# Patient Record
Sex: FEMALE | Race: ASIAN | NOT HISPANIC OR LATINO | URBAN - METROPOLITAN AREA
[De-identification: names, ages, dates, MRNs, and addresses within clinical notes are randomized per-mention and may not be internally consistent; named-entity substitution may affect disease eponyms.]

---

## 2017-11-29 PROBLEM — Z00.00 ENCOUNTER FOR PREVENTIVE HEALTH EXAMINATION: Status: ACTIVE | Noted: 2017-11-29

## 2017-12-12 VITALS
HEIGHT: 63 IN | OXYGEN SATURATION: 97 % | HEART RATE: 71 BPM | DIASTOLIC BLOOD PRESSURE: 68 MMHG | TEMPERATURE: 97 F | WEIGHT: 116.18 LBS | SYSTOLIC BLOOD PRESSURE: 151 MMHG

## 2017-12-12 RX ORDER — CHLORHEXIDINE GLUCONATE 213 G/1000ML
1 SOLUTION TOPICAL ONCE
Qty: 0 | Refills: 0 | Status: DISCONTINUED | OUTPATIENT
Start: 2017-12-14 | End: 2017-12-14

## 2017-12-12 NOTE — H&P ADULT - PMH
Breast cancer    CAD (coronary artery disease)    DM (diabetes mellitus)    GERD (gastroesophageal reflux disease)    HTN (hypertension)    Osteoarthritis    Prolapsed lumbar disc

## 2017-12-12 NOTE — H&P ADULT - ASSESSMENT
82 y/o F with PMHx HTN, DM, GERD, breast CA (s/p lumpectomy, received chemo and radiation tx until summer of 2015), CAD with prior cardiac cath in 2002 showing non-obstructive CAD          ASA III Mallampati ??  Pre-cath consented  Loaded     Risks & benefits of procedure and alternative therapy have been explained to the patient including but not limited to: allergic reaction, bleeding w/possible need for blood transfusion, infection, renal and vascular compromise, limb damage, arrhythmia, stroke, vessel dissection/perforation, Myocardial infarction, emergent CABG. Informed consent obtained and in chart. 82 y/o F with PMHx HTN, DM, GERD, breast CA (s/p lumpectomy, received chemo and radiation tx until summer of 2015), CAD with prior cardiac cath in 2002 showing non-obstructive CAD presents for cardiac catheterization secondary to CCS IV anginal equivalent symptoms with possible intervention          ASA III Mallampati III  Pre-cath consented      Risks & benefits of procedure and alternative therapy have been explained to the patient including but not limited to: allergic reaction, bleeding w/possible need for blood transfusion, infection, renal and vascular compromise, limb damage, arrhythmia, stroke, vessel dissection/perforation, Myocardial infarction, emergent CABG. Informed consent obtained and in chart. 84 y/o F with PMHx HTN, DM, GERD, breast CA (s/p lumpectomy, received chemo and radiation tx until summer of 2015), CAD with prior cardiac cath in 2002 showing non-obstructive CAD presents for cardiac catheterization secondary to CCS IV anginal equivalent symptoms with possible intervention          ASA III Mallampati III  Pre-cath consented  As discussed with Dr. Mendoza pt. Hgb 10.8 and Hct: 32.1 pt was give ASA 81mg PO x1      Risks & benefits of procedure and alternative therapy have been explained to the patient including but not limited to: allergic reaction, bleeding w/possible need for blood transfusion, infection, renal and vascular compromise, limb damage, arrhythmia, stroke, vessel dissection/perforation, Myocardial infarction, emergent CABG. Informed consent obtained and in chart.

## 2017-12-12 NOTE — H&P ADULT - HISTORY OF PRESENT ILLNESS
*SKELETON  84 y/o F with PMHx HTN, DM, GERD, breast CA (s/p lumpectomy, received chemo and radiation tx until summer of 2016), CAD with prior cardiac cath in 2002 showing non-obstructive CAD, who presented to her cardiologist c/o an episode of chest pain/heaving occurring at rest and lasting for 3-4 minutes.    EKG from the office shows sinus rhythm with known LBBB. In light of patient's risk factors, CCS Class IV angina symptoms, patient is recommended for cardiac catheterization with possible intervention if clinically indicated. *Patient bringing in medications, please confirm  82 y/o F with PMHx HTN, DM, GERD, breast CA (s/p lumpectomy, received chemo and radiation tx until summer of 2015), CAD with prior cardiac cath in 2002 showing non-obstructive CAD, who presented to her cardiologist c/o an episode of chest pain/heaviness occurring at rest and lasting for 3-4 minutes which spontaneously resolved. She denies SOB, dizziness, palpitations, n/v, diaphoresis, LE edema, orthopnea, or syncope. She also reports a prior CTA performed years ago showing calcifications at that time. EKG from the office shows sinus rhythm with known LBBB. In light of patient's risk factors, CCS Class IV angina symptoms, and known CAD, patient is recommended for cardiac catheterization with possible intervention if clinically indicated for further evaluation. 84 y/o F with PMHx HTN, DM, GERD, breast CA (s/p lumpectomy, received chemo and radiation tx until summer of 2015), CAD with prior cardiac cath in 2002 showing non-obstructive CAD, who presented to her cardiologist c/o an episode of chest pain/heaviness occurring at rest and lasting for 3-4 minutes which spontaneously resolved. She denies SOB, dizziness, palpitations, n/v, diaphoresis, LE edema, orthopnea, or syncope. She also reports a prior CTA performed years ago showing calcifications at that time. EKG from the office shows sinus rhythm with known LBBB. In light of patient's risk factors, CCS Class IV angina symptoms, and known CAD, patient is recommended for cardiac catheterization with possible intervention if clinically indicated for further evaluation.

## 2017-12-12 NOTE — H&P ADULT - FAMILY HISTORY
Sibling  Still living? Unknown  Family history of coronary artery disease, Age at diagnosis: Age Unknown

## 2017-12-14 ENCOUNTER — OUTPATIENT (OUTPATIENT)
Dept: OUTPATIENT SERVICES | Facility: HOSPITAL | Age: 82
LOS: 1 days | Discharge: NURSING, REHAB OR LONG-TERM CARE | End: 2017-12-14
Payer: MEDICARE

## 2017-12-14 DIAGNOSIS — Z98.890 OTHER SPECIFIED POSTPROCEDURAL STATES: Chronic | ICD-10-CM

## 2017-12-14 DIAGNOSIS — Z90.49 ACQUIRED ABSENCE OF OTHER SPECIFIED PARTS OF DIGESTIVE TRACT: Chronic | ICD-10-CM

## 2017-12-14 DIAGNOSIS — Z96.659 PRESENCE OF UNSPECIFIED ARTIFICIAL KNEE JOINT: Chronic | ICD-10-CM

## 2017-12-14 LAB
ALBUMIN SERPL ELPH-MCNC: 3.9 G/DL — SIGNIFICANT CHANGE UP (ref 3.3–5)
ALP SERPL-CCNC: 44 U/L — SIGNIFICANT CHANGE UP (ref 40–120)
ALT FLD-CCNC: 13 U/L — SIGNIFICANT CHANGE UP (ref 10–45)
ANION GAP SERPL CALC-SCNC: 12 MMOL/L — SIGNIFICANT CHANGE UP (ref 5–17)
APTT BLD: 36.1 SEC — SIGNIFICANT CHANGE UP (ref 27.5–37.4)
AST SERPL-CCNC: 17 U/L — SIGNIFICANT CHANGE UP (ref 10–40)
BASOPHILS NFR BLD AUTO: 0.3 % — SIGNIFICANT CHANGE UP (ref 0–2)
BILIRUB SERPL-MCNC: 0.5 MG/DL — SIGNIFICANT CHANGE UP (ref 0.2–1.2)
BUN SERPL-MCNC: 13 MG/DL — SIGNIFICANT CHANGE UP (ref 7–23)
CALCIUM SERPL-MCNC: 10.1 MG/DL — SIGNIFICANT CHANGE UP (ref 8.4–10.5)
CHLORIDE SERPL-SCNC: 96 MMOL/L — SIGNIFICANT CHANGE UP (ref 96–108)
CHOLEST SERPL-MCNC: 141 MG/DL — SIGNIFICANT CHANGE UP (ref 10–199)
CK MB CFR SERPL CALC: 1.9 NG/ML — SIGNIFICANT CHANGE UP (ref 0–6.7)
CK SERPL-CCNC: 109 U/L — SIGNIFICANT CHANGE UP (ref 25–170)
CO2 SERPL-SCNC: 28 MMOL/L — SIGNIFICANT CHANGE UP (ref 22–31)
CREAT SERPL-MCNC: 0.8 MG/DL — SIGNIFICANT CHANGE UP (ref 0.5–1.3)
CRP SERPL-MCNC: 0.1 MG/DL — SIGNIFICANT CHANGE UP (ref 0–0.4)
EOSINOPHIL NFR BLD AUTO: 6.7 % — HIGH (ref 0–6)
GLUCOSE BLDC GLUCOMTR-MCNC: 157 MG/DL — HIGH (ref 70–99)
GLUCOSE SERPL-MCNC: 124 MG/DL — HIGH (ref 70–99)
HBA1C BLD-MCNC: 6.8 % — HIGH (ref 4–5.6)
HCT VFR BLD CALC: 32.1 % — LOW (ref 34.5–45)
HDLC SERPL-MCNC: 37 MG/DL — LOW (ref 40–125)
HGB BLD-MCNC: 10.8 G/DL — LOW (ref 11.5–15.5)
INR BLD: 1.01 — SIGNIFICANT CHANGE UP (ref 0.88–1.16)
LIPID PNL WITH DIRECT LDL SERPL: 85 MG/DL — SIGNIFICANT CHANGE UP
LYMPHOCYTES # BLD AUTO: 34.4 % — SIGNIFICANT CHANGE UP (ref 13–44)
MCHC RBC-ENTMCNC: 29.4 PG — SIGNIFICANT CHANGE UP (ref 27–34)
MCHC RBC-ENTMCNC: 33.6 G/DL — SIGNIFICANT CHANGE UP (ref 32–36)
MCV RBC AUTO: 87.5 FL — SIGNIFICANT CHANGE UP (ref 80–100)
MONOCYTES NFR BLD AUTO: 6.5 % — SIGNIFICANT CHANGE UP (ref 2–14)
NEUTROPHILS NFR BLD AUTO: 52.1 % — SIGNIFICANT CHANGE UP (ref 43–77)
PLATELET # BLD AUTO: 286 K/UL — SIGNIFICANT CHANGE UP (ref 150–400)
POTASSIUM SERPL-MCNC: 4 MMOL/L — SIGNIFICANT CHANGE UP (ref 3.5–5.3)
POTASSIUM SERPL-SCNC: 4 MMOL/L — SIGNIFICANT CHANGE UP (ref 3.5–5.3)
PROT SERPL-MCNC: 7.1 G/DL — SIGNIFICANT CHANGE UP (ref 6–8.3)
PROTHROM AB SERPL-ACNC: 11.2 SEC — SIGNIFICANT CHANGE UP (ref 9.8–12.7)
RBC # BLD: 3.67 M/UL — LOW (ref 3.8–5.2)
RBC # FLD: 14.4 % — SIGNIFICANT CHANGE UP (ref 10.3–16.9)
SODIUM SERPL-SCNC: 136 MMOL/L — SIGNIFICANT CHANGE UP (ref 135–145)
TOTAL CHOLESTEROL/HDL RATIO MEASUREMENT: 3.8 RATIO — SIGNIFICANT CHANGE UP (ref 3.3–7.1)
TRIGL SERPL-MCNC: 96 MG/DL — SIGNIFICANT CHANGE UP (ref 10–149)
WBC # BLD: 5.8 K/UL — SIGNIFICANT CHANGE UP (ref 3.8–10.5)
WBC # FLD AUTO: 5.8 K/UL — SIGNIFICANT CHANGE UP (ref 3.8–10.5)

## 2017-12-14 PROCEDURE — 82553 CREATINE MB FRACTION: CPT

## 2017-12-14 PROCEDURE — 93010 ELECTROCARDIOGRAM REPORT: CPT

## 2017-12-14 PROCEDURE — 85025 COMPLETE CBC W/AUTO DIFF WBC: CPT

## 2017-12-14 PROCEDURE — 83036 HEMOGLOBIN GLYCOSYLATED A1C: CPT

## 2017-12-14 PROCEDURE — 36415 COLL VENOUS BLD VENIPUNCTURE: CPT

## 2017-12-14 PROCEDURE — 82550 ASSAY OF CK (CPK): CPT

## 2017-12-14 PROCEDURE — 85730 THROMBOPLASTIN TIME PARTIAL: CPT

## 2017-12-14 PROCEDURE — 86140 C-REACTIVE PROTEIN: CPT

## 2017-12-14 PROCEDURE — C1894: CPT

## 2017-12-14 PROCEDURE — 82962 GLUCOSE BLOOD TEST: CPT

## 2017-12-14 PROCEDURE — 93005 ELECTROCARDIOGRAM TRACING: CPT

## 2017-12-14 PROCEDURE — 93458 L HRT ARTERY/VENTRICLE ANGIO: CPT

## 2017-12-14 PROCEDURE — 85610 PROTHROMBIN TIME: CPT

## 2017-12-14 PROCEDURE — C1889: CPT

## 2017-12-14 PROCEDURE — 80061 LIPID PANEL: CPT

## 2017-12-14 PROCEDURE — C1769: CPT

## 2017-12-14 PROCEDURE — 80053 COMPREHEN METABOLIC PANEL: CPT

## 2017-12-14 PROCEDURE — C1887: CPT

## 2017-12-14 RX ORDER — METFORMIN HYDROCHLORIDE 850 MG/1
1 TABLET ORAL
Qty: 0 | Refills: 0 | COMMUNITY

## 2017-12-14 RX ORDER — OMEPRAZOLE 10 MG/1
1 CAPSULE, DELAYED RELEASE ORAL
Qty: 0 | Refills: 0 | COMMUNITY

## 2017-12-14 RX ORDER — AMLODIPINE BESYLATE 2.5 MG/1
1 TABLET ORAL
Qty: 0 | Refills: 0 | COMMUNITY

## 2017-12-14 RX ORDER — EXEMESTANE 25 MG/1
1 TABLET, SUGAR COATED ORAL
Qty: 0 | Refills: 0 | COMMUNITY

## 2017-12-14 RX ORDER — ASPIRIN/CALCIUM CARB/MAGNESIUM 324 MG
1 TABLET ORAL
Qty: 0 | Refills: 0 | COMMUNITY

## 2017-12-14 RX ORDER — SODIUM CHLORIDE 9 MG/ML
500 INJECTION INTRAMUSCULAR; INTRAVENOUS; SUBCUTANEOUS
Qty: 0 | Refills: 0 | Status: DISCONTINUED | OUTPATIENT
Start: 2017-12-14 | End: 2017-12-14

## 2017-12-14 RX ORDER — INSULIN LISPRO 100/ML
VIAL (ML) SUBCUTANEOUS ONCE
Qty: 0 | Refills: 0 | Status: DISCONTINUED | OUTPATIENT
Start: 2017-12-14 | End: 2017-12-14

## 2017-12-14 RX ORDER — ASPIRIN/CALCIUM CARB/MAGNESIUM 324 MG
81 TABLET ORAL ONCE
Qty: 0 | Refills: 0 | Status: COMPLETED | OUTPATIENT
Start: 2017-12-14 | End: 2017-12-14

## 2017-12-14 RX ORDER — CITALOPRAM 10 MG/1
1.5 TABLET, FILM COATED ORAL
Qty: 0 | Refills: 0 | COMMUNITY

## 2017-12-14 RX ORDER — CARVEDILOL PHOSPHATE 80 MG/1
1 CAPSULE, EXTENDED RELEASE ORAL
Qty: 0 | Refills: 0 | COMMUNITY

## 2017-12-14 RX ORDER — CANDESARTAN CILEXETIL 8 MG/1
1 TABLET ORAL
Qty: 0 | Refills: 0 | COMMUNITY

## 2017-12-14 RX ADMIN — Medication 81 MILLIGRAM(S): at 08:27

## 2017-12-14 RX ADMIN — SODIUM CHLORIDE 75 MILLILITER(S): 9 INJECTION INTRAMUSCULAR; INTRAVENOUS; SUBCUTANEOUS at 08:00

## 2017-12-14 NOTE — PROGRESS NOTE ADULT - SUBJECTIVE AND OBJECTIVE BOX
Interventional Cardiology PA SDA Discharge Note    Patient without complaints. Ambulated and voided without difficulties    Afebrile, VSS    Ext:    		Right groin: no hematoma, no bleed, distal pulse intact: Ambulate @ 130 PM    A/P:    84 y/o F with PMHx HTN, DM, GERD, breast CA (s/p lumpectomy, received chemo and radiation tx until summer of 2015), CAD with prior cardiac cath in 2002 showing non-obstructive CAD, who presented to her cardiologist c/o an episode of chest pain/heaviness occurring at rest and lasting for 3-4 minutes which spontaneously resolved. She denies SOB, dizziness, palpitations, n/v, diaphoresis, LE edema, orthopnea, or syncope. She also reports a prior CTA performed years ago showing calcifications at that time. EKG from the office shows sinus rhythm with known LBBB. Pt was referred for cardiac catheterization today revealing non obstructive CAD. For continued medical management. Pt will follow up with Dr. Ho in one week for a check up.               1.	Stable for discharge as per attending  _____Reji____ after bed rest, pt voids, groin/wrist stable and 30 minutes of ambulation.  2.	Follow-up with PMD/Cardiologist ___Dr. Ho________ in 1-2 weeks  3.	Discharged forms signed and copies in chart

## 2017-12-14 NOTE — PROGRESS NOTE ADULT - SUBJECTIVE AND OBJECTIVE BOX
Interventional Cardiology PA SDA Discharge Note    Patient without complaints. Ambulated and voided without difficulties    Afebrile, VSS    Ext:    		Right   Groin:  No     hematoma, no    bruit, dressing; C/D/I  		    Pulses:    intact DP/PT to baseline     A/P:    84 y/o F with PMHx HTN, DM, GERD, breast CA (s/p lumpectomy, received chemo and radiation tx until summer of 2015), CAD with prior cardiac cath in 2002 showing non-obstructive CAD, who presented to her cardiologist c/o an episode of chest pain/heaviness occurring at rest and lasting for 3-4 minutes which spontaneously resolved. She denies SOB, dizziness, palpitations, n/v, diaphoresis, LE edema, orthopnea, or syncope. She also reports a prior CTA performed years ago showing calcifications at that time. EKG from the office shows sinus rhythm with known LBBB. In light of patient's risk factors, CCS Class IV angina symptoms, and known CAD, patient was recommended for cardiac catheterization with possible intervention if clinically indicated for further evaluation. Patient is now s/p diagnostic cardiac cath 12/14/17 revealing non-obstructive CAD: LM: normal, LAD: mid 30% stenosis, LCx: luminal irregularities  RCA: luminal irregularities, normal left ventricular systolic function with estimated LVEF=60%, LVEDP=14mmHg, right groin manual sheath pull.       1.	Stable for discharge as per attending Dr. Mendoza after bed rest, pt voids, groin stable and 30 minutes of ambulation.  2.	Follow-up with PMD/Cardiologist Reji in 1-2 weeks  3.	Discharged forms signed and copies in chart Interventional Cardiology PA SDA Discharge Note    Patient without complaints. Ambulated and voided without difficulties    Afebrile, VSS    Ext:    		Right   Groin:  No     hematoma, no    bruit, dressing; C/D/I  		    Pulses:    intact DP/PT to baseline     A/P:    84 y/o F with PMHx HTN, DM, GERD, breast CA (s/p lumpectomy, received chemo and radiation tx until summer of 2015), CAD with prior cardiac cath in 2002 showing non-obstructive CAD, who presented to her cardiologist c/o an episode of chest pain/heaviness occurring at rest and lasting for 3-4 minutes which spontaneously resolved. She denies SOB, dizziness, palpitations, n/v, diaphoresis, LE edema, orthopnea, or syncope. She also reports a prior CTA performed years ago showing calcifications at that time. EKG from the office shows sinus rhythm with known LBBB. In light of patient's risk factors, CCS Class IV angina symptoms, and known CAD, patient was recommended for cardiac catheterization with possible intervention if clinically indicated for further evaluation. Patient is now s/p diagnostic cardiac cath 12/14/17 revealing non-obstructive CAD: LM: normal, LAD: mid 30% stenosis, LCx: luminal irregularities  RCA: luminal irregularities, normal left ventricular systolic function with estimated LVEF=60%, LVEDP=14mmHg, right groin manual sheath pull.       1.	Stable for discharge as per attending Dr. Mendoza after bed rest, pt voids, groin stable and 30 minutes of ambulation.  2.	Follow-up with PMD/Cardiologist [place] in 1-2 weeks  3.	Discharged forms signed and copies in chart

## 2020-07-29 PROBLEM — M51.26 OTHER INTERVERTEBRAL DISC DISPLACEMENT, LUMBAR REGION: Chronic | Status: ACTIVE | Noted: 2017-12-12

## 2020-07-29 PROBLEM — M19.90 UNSPECIFIED OSTEOARTHRITIS, UNSPECIFIED SITE: Chronic | Status: ACTIVE | Noted: 2017-12-12

## 2020-07-29 PROBLEM — K21.9 GASTRO-ESOPHAGEAL REFLUX DISEASE WITHOUT ESOPHAGITIS: Chronic | Status: ACTIVE | Noted: 2017-12-12

## 2020-07-29 PROBLEM — E11.9 TYPE 2 DIABETES MELLITUS WITHOUT COMPLICATIONS: Chronic | Status: ACTIVE | Noted: 2017-12-12

## 2020-07-29 PROBLEM — C50.919 MALIGNANT NEOPLASM OF UNSPECIFIED SITE OF UNSPECIFIED FEMALE BREAST: Chronic | Status: ACTIVE | Noted: 2017-12-12

## 2020-07-29 PROBLEM — I10 ESSENTIAL (PRIMARY) HYPERTENSION: Chronic | Status: ACTIVE | Noted: 2017-12-12

## 2020-07-29 PROBLEM — I25.10 ATHEROSCLEROTIC HEART DISEASE OF NATIVE CORONARY ARTERY WITHOUT ANGINA PECTORIS: Chronic | Status: ACTIVE | Noted: 2017-12-12

## 2020-09-09 ENCOUNTER — APPOINTMENT (OUTPATIENT)
Dept: PULMONOLOGY | Facility: CLINIC | Age: 85
End: 2020-09-09

## 2020-12-06 ENCOUNTER — EMERGENCY (EMERGENCY)
Facility: HOSPITAL | Age: 85
LOS: 1 days | Discharge: HOME | End: 2020-12-06
Attending: EMERGENCY MEDICINE | Admitting: EMERGENCY MEDICINE
Payer: MEDICARE

## 2020-12-06 VITALS
OXYGEN SATURATION: 95 % | SYSTOLIC BLOOD PRESSURE: 131 MMHG | RESPIRATION RATE: 18 BRPM | HEIGHT: 63 IN | HEART RATE: 83 BPM | DIASTOLIC BLOOD PRESSURE: 60 MMHG | TEMPERATURE: 99 F

## 2020-12-06 DIAGNOSIS — Z20.828 CONTACT WITH AND (SUSPECTED) EXPOSURE TO OTHER VIRAL COMMUNICABLE DISEASES: ICD-10-CM

## 2020-12-06 DIAGNOSIS — Z79.899 OTHER LONG TERM (CURRENT) DRUG THERAPY: ICD-10-CM

## 2020-12-06 DIAGNOSIS — Z96.659 PRESENCE OF UNSPECIFIED ARTIFICIAL KNEE JOINT: Chronic | ICD-10-CM

## 2020-12-06 DIAGNOSIS — Z91.018 ALLERGY TO OTHER FOODS: ICD-10-CM

## 2020-12-06 DIAGNOSIS — Z90.49 ACQUIRED ABSENCE OF OTHER SPECIFIED PARTS OF DIGESTIVE TRACT: Chronic | ICD-10-CM

## 2020-12-06 DIAGNOSIS — Z98.890 OTHER SPECIFIED POSTPROCEDURAL STATES: Chronic | ICD-10-CM

## 2020-12-06 PROCEDURE — 99283 EMERGENCY DEPT VISIT LOW MDM: CPT | Mod: CS

## 2020-12-06 NOTE — ED PROVIDER NOTE - CLINICAL SUMMARY MEDICAL DECISION MAKING FREE TEXT BOX
Asymptomatic patient here for COVID screening. Risk of exposure is very low. Reviewed vitals and testing plan with the patient. Encouraged to download the follow my health herlinda for test results.

## 2020-12-06 NOTE — ED PROVIDER NOTE - OBJECTIVE STATEMENT
The patient is presenting to the ED requesting COVID-19 screening s/p exposure on 11/29. The patient is currently asymptomatic and has no other medical complaints at this time. Denies headache,  fever, chills, chest pain, SOB, cough, body aches, change to sense of smell or taste

## 2020-12-06 NOTE — ED PROVIDER NOTE - PATIENT PORTAL LINK FT
You can access the FollowMyHealth Patient Portal offered by [facility] by registering at the following website: [email or web address]. By joining [employer]’s FollowMyHealth portal, you will also be able to view your health information using other applications (apps) compatible with our system.

## 2020-12-06 NOTE — ED PROVIDER NOTE - NSFOLLOWUPINSTRUCTIONS_ED_ALL_ED_FT
You have been screened/tested  for Coronavirus.    All patients that are stable are being discharged from the ED, even if there is a concern for coronavirus. Since you are stable, you are being discharged. Your test results may take 3-5 days.  You will get a text message or email with results. Please check the patient online portal for results. Please follow the instructions on provided coronavirus discharge educational forms and self quarantine for 14 days if you have any symptoms.     Occasionally, we see false negative results. If you have symptoms after a negative test, you should continue to isolate and get retested.       RETURN TO THE ER IMMEDIATELY IF YOU HAVE WORSENING SHORTNESS OF BREATH. SYMPTOMS USUALLY PEAK BETWEEN DAY 7-10.    THE COVID 19 TEST   - results may take up to 3-5 days to become available   - if your result is positive, you will receive a phone call, text or email from one of our coronavirus specialists   - a negative result may not be communicated on time due to the sheer volume of testing from the ER. If you haven't heard from us within 5 days, you can check [facility] or call [address] [place] [address] (please do not call the ER for results)    Please continue to self quarantine (home isolation) until your result is back and follow instructions accordingly  - positive: complete home isolation for a total of 14 days since day of testing and no more fever with feeling back to baseline   - negative: you will be able to stop home isolation but still practice standard precautions, similar to how you would manage a regular flu/cold     Return to ER for any worsening symptoms, such as persistent fever >100.4F, shortness of breath, coughing up bloody sputum, chest pain, lethargy, and fainting    Please remember to wash your hands frequently (>20 seconds each time), avoid touching your face, and cover your cough/sneeze    Only take tylenol for fever/pain control and avoid NSAIDs (ibuprofen/advil/aleve/naproxen) due to potential increased risk of exacerbating COVID-19 infection

## 2024-04-19 ENCOUNTER — APPOINTMENT (OUTPATIENT)
Dept: OTOLARYNGOLOGY | Facility: CLINIC | Age: 89
End: 2024-04-19
Payer: MEDICARE

## 2024-04-19 DIAGNOSIS — Z85.3 PERSONAL HISTORY OF MALIGNANT NEOPLASM OF BREAST: ICD-10-CM

## 2024-04-19 DIAGNOSIS — Z78.9 OTHER SPECIFIED HEALTH STATUS: ICD-10-CM

## 2024-04-19 DIAGNOSIS — H90.6 MIXED CONDUCTIVE AND SENSORINEURAL HEARING LOSS, BILATERAL: ICD-10-CM

## 2024-04-19 DIAGNOSIS — H90.A22 SENSORINEURAL HEARING LOSS, UNILATERAL, LEFT EAR, WITH RESTRICTED HEARING ON THE CONTRALATERAL SIDE: ICD-10-CM

## 2024-04-19 DIAGNOSIS — Z82.49 FAMILY HISTORY OF ISCHEMIC HEART DISEASE AND OTHER DISEASES OF THE CIRCULATORY SYSTEM: ICD-10-CM

## 2024-04-19 PROCEDURE — 92557 COMPREHENSIVE HEARING TEST: CPT

## 2024-04-19 PROCEDURE — 92567 TYMPANOMETRY: CPT

## 2024-04-19 PROCEDURE — 99204 OFFICE O/P NEW MOD 45 MIN: CPT | Mod: 25

## 2024-04-19 PROCEDURE — 31237 NSL/SINS NDSC SURG BX POLYPC: CPT | Mod: LT

## 2024-04-19 RX ORDER — METFORMIN HYDROCHLORIDE 625 MG/1
TABLET ORAL
Refills: 0 | Status: ACTIVE | COMMUNITY

## 2024-04-19 RX ORDER — SPIRONOLACTONE 50 MG/1
TABLET ORAL
Refills: 0 | Status: ACTIVE | COMMUNITY

## 2024-04-19 RX ORDER — AMOXICILLIN AND CLAVULANATE POTASSIUM 500; 125 MG/1; MG/1
500-125 TABLET, FILM COATED ORAL
Qty: 20 | Refills: 0 | Status: ACTIVE | COMMUNITY
Start: 2024-04-19 | End: 1900-01-01

## 2024-04-19 RX ORDER — OMEPRAZOLE 20 MG/1
TABLET, DELAYED RELEASE ORAL
Refills: 0 | Status: ACTIVE | COMMUNITY

## 2024-04-19 RX ORDER — AMLODIPINE BESYLATE 5 MG/1
TABLET ORAL
Refills: 0 | Status: ACTIVE | COMMUNITY

## 2024-04-22 RX ORDER — SULFAMETHOXAZOLE AND TRIMETHOPRIM 800; 160 MG/1; MG/1
800-160 TABLET ORAL TWICE DAILY
Qty: 20 | Refills: 0 | Status: ACTIVE | COMMUNITY
Start: 2024-04-22 | End: 1900-01-01

## 2024-04-23 PROBLEM — H90.6 MIXED CONDUCTIVE AND SENSORINEURAL HEARING LOSS OF BOTH EARS: Status: ACTIVE | Noted: 2024-04-23

## 2024-04-25 LAB — EAR NOSE AND THROAT CULTURE: ABNORMAL

## 2024-05-03 ENCOUNTER — APPOINTMENT (OUTPATIENT)
Dept: OTOLARYNGOLOGY | Facility: CLINIC | Age: 89
End: 2024-05-03
Payer: MEDICARE

## 2024-05-03 PROCEDURE — 99214 OFFICE O/P EST MOD 30 MIN: CPT

## 2024-05-03 RX ORDER — SULFAMETHOXAZOLE AND TRIMETHOPRIM 800; 160 MG/1; MG/1
800-160 TABLET ORAL TWICE DAILY
Qty: 20 | Refills: 0 | Status: ACTIVE | COMMUNITY
Start: 2024-05-03 | End: 1900-01-01

## 2024-05-03 NOTE — ASSESSMENT
[FreeTextEntry1] : She has a history of chronic left sinusitis and sinus surgery.  Her exam showed significant improvement.  There is only a little bit of crusting.  There was very mild inflammation.  She does feel better.  Culture grew out MRSA.  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  She has a retraction pocket posteriorly.  There is no obvious cholesteatoma  Plan -Findings and management options were discussed with the patient and her sister - I am starting her on nasal saline irrigations with mometasone and mupirocin - I am going to see if I can get the results of her prior scans - She should take the Bactrim if she has any symptoms - We will continue to monitor her ears.  There was no obvious cholesteatoma on the right side. - We will see her audiologist as needed to have the hearing aids checked - I am going to see her back in approximately 6 weeks - She should call and return earlier if any problems

## 2024-05-03 NOTE — HISTORY OF PRESENT ILLNESS
[de-identified] : MISBAH LOZA is a 89 year old patient Here for follow-up for sinusitis.  She has a history of left sinus surgery elsewhere in the past.  She had significant crusting in the left maxillary sinus and nasal cavity.  This was debrided at her last visit.  She was placed on Augmentin.  This was switched to Bactrim based on the culture results.  The culture grew MRSA.  She said that she did not yet start the Bactrim.  She does feel like the postnasal drip is better as is the left ear pressure.  However, she still has some crusting in the nose.  ENT history She had chronic ear problems as a child No history of otologic surgery She had nasal trauma as a child She has allergies to dust and dust mites. She saw an allergist in the past She has no pets at home She has a history of reflux and is on medication She had left endoscopic sinus surgery a few years ago at [place] She has a history of bilateral hearing loss and uses hearing aids On exam, she has a deep retraction pocket on the right side.  There was no obvious cholesteatoma She had laryngeal changes consistent with reflux on flexible laryngoscopy She has a history of breast cancer and is followed at [facility].  She has a right-sided neck mass which she and her sister say they are observing

## 2024-05-03 NOTE — PHYSICAL EXAM
[TextEntry] : General: The patient was alert, oriented and in no distress. Voice was clear.  Face: The patient had no facial asymmetry or mass. The skin was unremarkable.  Ears: She is hard of hearing External ears were normal without deformity. Ear canals: Hearing aids in place  Nose: The external nose had no significant deformity. . On anterior rhinoscopy, the nasal mucosa was clear. The anterior septum was grossly midline. There were no visualized polyps, purulence or masses.  Oral cavity: Oral mucosa- normal. Oral and base of tongue- clear and without mass. Gingival and buccal mucosa- moist and without lesions. Palate- the palate moved well. There was no cleft palate. There appeared to be good salivary flow. Oral cavity/oropharynx- no pus, erythema or mass  Neck: The neck was symmetrical. The parotid and submandibular glands were normal without masses. The trachea was midline and there was no unusual crepitus. Thyroid was smooth and nontender and no masses were palpated. There was a 3 cm firm fixed mass involving the right lower neck-no change  PROCEDURE: NASAL ENDOSCOPY WITH SURGICAL DEBRIDEMENT  Surgeon: Dr. Lorenzo Indication: Chronic rhinosinusitis, history of sinus surgery, inadequate exam on anterior rhinoscopy Anesthetic: Topical lidocaine and Afrin Procedure: The patient was placed in a sitting position. Following application of the topical anesthetic and decongestant, exam was performed with a flexible fiberoptic scope followed by a rigid endoscope. The scope was passed along the left nasal floor to the nasopharynx. It was then passed into the region of the middle meatus, middle turbinate, and sphenoethmoid region.  Debridement was performed with the 0 deg endoscope and sinus instruments. The following findings were noted:  Nasal mucosa: Mild edema Septum: Mildly deviated Turbinates: Inferior, middle and superior turbinates- normal  Left nasal cavity-   Inferior meatus: she had an inferior antrostomy.  There was a small crust which was removed with a suction and forceps.  There was mild inflammation but it was significantly better.  Middle meatus: Postsurgical changes-there were tiny polyps.  Ethmoid sinuses look clear.  Superior meatus; normal  Right sphenoethmoidal recess: no pus, polyps or congestion  Nasopharynx: no masses, choanae patent, no adenoid tissue

## 2024-06-27 ENCOUNTER — APPOINTMENT (OUTPATIENT)
Dept: OTOLARYNGOLOGY | Facility: CLINIC | Age: 89
End: 2024-06-27
Payer: MEDICARE

## 2024-06-27 DIAGNOSIS — H73.891 OTHER SPECIFIED DISORDERS OF TYMPANIC MEMBRANE, RIGHT EAR: ICD-10-CM

## 2024-06-27 DIAGNOSIS — R09.82 POSTNASAL DRIP: ICD-10-CM

## 2024-06-27 DIAGNOSIS — J31.0 CHRONIC RHINITIS: ICD-10-CM

## 2024-06-27 DIAGNOSIS — H90.A31 MIXED CONDUCTIVE AND SENSORINEURAL HEARING LOSS, UNILATERAL, RIGHT EAR WITH RESTRICTED HEARING ON THE  CONTRALATERAL SIDE: ICD-10-CM

## 2024-06-27 DIAGNOSIS — J32.0 CHRONIC MAXILLARY SINUSITIS: ICD-10-CM

## 2024-06-27 DIAGNOSIS — H69.93 UNSPECIFIED EUSTACHIAN TUBE DISORDER, BILATERAL: ICD-10-CM

## 2024-06-27 PROCEDURE — 31231 NASAL ENDOSCOPY DX: CPT

## 2024-06-27 PROCEDURE — 99213 OFFICE O/P EST LOW 20 MIN: CPT | Mod: 25

## 2024-07-09 ENCOUNTER — APPOINTMENT (OUTPATIENT)
Dept: OTOLARYNGOLOGY | Facility: CLINIC | Age: 89
End: 2024-07-09
Payer: MEDICARE

## 2024-07-09 DIAGNOSIS — J32.0 CHRONIC MAXILLARY SINUSITIS: ICD-10-CM

## 2024-07-09 DIAGNOSIS — J31.0 CHRONIC RHINITIS: ICD-10-CM

## 2024-07-09 PROCEDURE — 31231 NASAL ENDOSCOPY DX: CPT | Mod: 52,LT

## 2024-07-09 PROCEDURE — 99213 OFFICE O/P EST LOW 20 MIN: CPT | Mod: 25

## 2024-08-09 ENCOUNTER — APPOINTMENT (OUTPATIENT)
Dept: OTOLARYNGOLOGY | Facility: CLINIC | Age: 89
End: 2024-08-09

## 2024-08-09 PROCEDURE — 31231 NASAL ENDOSCOPY DX: CPT

## 2024-08-09 PROCEDURE — 99213 OFFICE O/P EST LOW 20 MIN: CPT | Mod: 25

## 2024-08-09 NOTE — PHYSICAL EXAM
[TextEntry] : General: The patient was alert, oriented and in no distress. Voice was clear.  Face: The patient had no facial asymmetry or mass. The skin was unremarkable.  Ears: She is hard of hearing External ears were normal without deformity. Ear canals: No cerumen or inflammation Tympanic membranes: Right.  Deep retracted.  Pocket posteriorly with adhesive otitis media.  There is no inflammation, drainage, or cholesteatoma.  There was scarring of the anterior portion of tympanic membrane Left ear-intact.  No perforation or effusion  Nose: The external nose had no significant deformity. On anterior rhinoscopy, the nasal mucosa was clear. The anterior septum was grossly midline. There were no visualized polyps, purulence or masses.  Oral cavity: Oral mucosa- normal. Oral and base of tongue- clear and without mass. Gingival and buccal mucosa- moist and without lesions. Palate- the palate moved well. There was no cleft palate. There appeared to be good salivary flow. Oral cavity/oropharynx- no pus, erythema or mass  Neck: The neck was symmetrical. The parotid and submandibular glands were normal without masses. The trachea was midline and there was no unusual crepitus. Thyroid was smooth and nontender and no masses were palpated. There was a 4 cm firm fixed mass involving the right lower neck-it has enlarged some since her last visit  PROCEDURE: NASAL ENDOSCOPY  Surgeon: Dr. Lorenzo Indication: Chronic rhinosinusitis, history of sinus surgery, inadequate exam on anterior rhinoscopy Anesthetic: Topical lidocaine and Afrin Procedure: The patient was placed in a sitting position. Following application of the topical anesthetic and decongestant, exam was performed with a rigid endoscope. The scope was passed along the left nasal floor to the nasopharynx. It was then passed into the region of the middle meatus, middle turbinate, and sphenoethmoid region. The right side was also evaluated. The following findings were noted:  Nasal mucosa: Mild edema Septum: Mildly deviated Turbinates: Inferior, middle and superior turbinates- normal  Right nasal cavity- Inferior and middle meatus were normal  Left nasal cavity- Inferior meatus: she had an inferior antrostomy. Middle meatus: Postsurgical changes-there was a crust within the maxillary sinus.  There is no significant drainage in the nasal cavity. Superior meatus; normal sphenoethmoidal recess: no pus, polyps or congestion  Nasopharynx: no masses, choanae patent, no adenoid tissue

## 2024-08-09 NOTE — HISTORY OF PRESENT ILLNESS
[de-identified] : MISBAH LOZA is a 89 year old patient With a history of chronic left sinusitis and sinus surgery.  She has had fatigue but is not complaining of a lot of sinus symptoms.  She had been using the nasal saline irrigations but stopped them.  I had recommended the irrigations with topical mometasone and mupirocin but is unclear if she ever did use them.  She looks much better on her last evaluation.  She is due to have a follow-up PET CT scan in September.  She is followed by the oncologist for her metastatic breast cancer.  She has a right neck mass which is likely a metastasis.  ENT history She had chronic ear problems as a child No history of otologic surgery She had nasal trauma as a child She has allergies to dust and dust mites. She saw an allergist in the past She has no pets at home She has a history of reflux and is on medication She had left endoscopic sinus surgery a few years ago at [place] She has a history of bilateral hearing loss and uses hearing aids On exam, she has a deep retraction pocket on the right side. There was no obvious cholesteatoma She had laryngeal changes consistent with reflux on flexible laryngoscopy She has a history of breast cancer and is followed at [facility]. She has a right-sided neck mass which she and her sister say they are observing.

## 2024-08-09 NOTE — ASSESSMENT
[FreeTextEntry1] : She has a history of chronic left sinusitis.  She is developing another crust within the maxillary sinus.  She has not been using the nasal saline irrigations.  She has bilateral hearing loss.  Her ear exam is stable.  She has a history of metastatic breast cancer.  She has a right neck mass which is enlarging.  She is followed by the oncologist   Plan -Findings and management options were discussed with the patient and her aide - I recommended that she restart the nasal saline irrigations.  I have asked her to consider using the topical mometasone and mupirocin.  If she does not wish to, she should at least do the nasal saline irrigations - We will continue to monitor her hearing - She is going to follow-up with her oncologist for management of the metastatic breast cancer - Follow-up in 2 months - She should return earlier if any problems

## 2024-11-05 ENCOUNTER — APPOINTMENT (OUTPATIENT)
Dept: OTOLARYNGOLOGY | Facility: CLINIC | Age: 89
End: 2024-11-05